# Patient Record
Sex: MALE | Race: WHITE | NOT HISPANIC OR LATINO | URBAN - NONMETROPOLITAN AREA
[De-identification: names, ages, dates, MRNs, and addresses within clinical notes are randomized per-mention and may not be internally consistent; named-entity substitution may affect disease eponyms.]

---

## 2022-03-31 ENCOUNTER — OFFICE VISIT (OUTPATIENT)
Dept: URBAN - NONMETROPOLITAN AREA CLINIC 2 | Facility: CLINIC | Age: 67
End: 2022-03-31

## 2022-08-04 ENCOUNTER — WEB ENCOUNTER (OUTPATIENT)
Dept: URBAN - NONMETROPOLITAN AREA CLINIC 2 | Facility: CLINIC | Age: 67
End: 2022-08-04

## 2022-08-04 ENCOUNTER — LAB OUTSIDE AN ENCOUNTER (OUTPATIENT)
Dept: URBAN - NONMETROPOLITAN AREA CLINIC 2 | Facility: CLINIC | Age: 67
End: 2022-08-04

## 2022-08-04 ENCOUNTER — OFFICE VISIT (OUTPATIENT)
Dept: URBAN - NONMETROPOLITAN AREA CLINIC 2 | Facility: CLINIC | Age: 67
End: 2022-08-04
Payer: COMMERCIAL

## 2022-08-04 VITALS
TEMPERATURE: 97.7 F | BODY MASS INDEX: 27.77 KG/M2 | DIASTOLIC BLOOD PRESSURE: 90 MMHG | HEIGHT: 70 IN | HEART RATE: 76 BPM | SYSTOLIC BLOOD PRESSURE: 144 MMHG | WEIGHT: 194 LBS

## 2022-08-04 DIAGNOSIS — C34.90 MALIGNANT NEOPLASM OF LUNG, UNSPECIFIED LATERALITY, UNSPECIFIED PART OF LUNG: ICD-10-CM

## 2022-08-04 DIAGNOSIS — Z12.11 ROUTINE COLON: ICD-10-CM

## 2022-08-04 DIAGNOSIS — R13.19 ESOPHAGEAL DYSPHAGIA: ICD-10-CM

## 2022-08-04 DIAGNOSIS — D36.9 TUBULOVILLOUS ADENOMA: ICD-10-CM

## 2022-08-04 PROCEDURE — 99204 OFFICE O/P NEW MOD 45 MIN: CPT | Performed by: NURSE PRACTITIONER

## 2022-08-04 RX ORDER — ATORVASTATIN CALCIUM, FILM COATED 20 MG/1
TAKE 1 TABLET BY MOUTH EVERY DAY TABLET ORAL
Qty: 90 EACH | Refills: 1 | Status: ACTIVE | COMMUNITY

## 2022-08-04 NOTE — HPI-TODAY'S VISIT:
8/4/2022 Mr. Perrin presents for follow-up of personal history of colon polyps.  In September 2016 he had a colonoscopy with a 4 cm cecal tubulovillous adenoma and 2 smaller tubular adenomas status post removal by Dr. Pimentel and Johana for bleeding.  He had a repeat colonoscopy a month later with no residual tissue.  He was recommended to repeat colonoscopy in 3 to 5 years however due to the COVID-19 pandemic he did not pursue this.  He has recently been diagnosed with lung cancer in the past 2 years.  He status post right lobectomy last year.  He is following with Dr. Mendez and just completed another round of chemotherapy.  He is due for repeat imaging next month.  He states after retiring 2 years ago he developed a food impaction after eating steak at his half-way party he went to [facility] status post EGD by Dr. Navarrete.  He was recommended again to repeat endoscopy after his food impaction, he did not follow-up given the pandemic.  He does have occasional dysphagia to solids, he denies any reflux or heartburn.  Today we have discussed risks and benefits and he agrees to pursue EGD and colonoscopy.  We will get the records from Dr. Torres.  MB

## 2022-09-29 ENCOUNTER — OFFICE VISIT (OUTPATIENT)
Dept: URBAN - METROPOLITAN AREA MEDICAL CENTER 1 | Facility: MEDICAL CENTER | Age: 67
End: 2022-09-29
Payer: COMMERCIAL

## 2022-09-29 ENCOUNTER — LAB OUTSIDE AN ENCOUNTER (OUTPATIENT)
Dept: URBAN - NONMETROPOLITAN AREA CLINIC 2 | Facility: CLINIC | Age: 67
End: 2022-09-29

## 2022-09-29 DIAGNOSIS — R10.13 ABDOMINAL DISCOMFORT, EPIGASTRIC: ICD-10-CM

## 2022-09-29 DIAGNOSIS — Z86.010 ADENOMAS PERSONAL HISTORY OF COLONIC POLYPS: ICD-10-CM

## 2022-09-29 DIAGNOSIS — K31.89 ACQUIRED DEFORMITY OF DUODENUM: ICD-10-CM

## 2022-09-29 DIAGNOSIS — K22.2 ACQUIRED ESOPHAGEAL RING: ICD-10-CM

## 2022-09-29 DIAGNOSIS — D12.2 ADENOMA OF ASCENDING COLON: ICD-10-CM

## 2022-09-29 DIAGNOSIS — D12.0 ADENOMA OF CECUM: ICD-10-CM

## 2022-09-29 DIAGNOSIS — K22.89 DILATATION OF ESOPHAGUS: ICD-10-CM

## 2022-09-29 PROCEDURE — 45385 COLONOSCOPY W/LESION REMOVAL: CPT | Performed by: INTERNAL MEDICINE

## 2022-09-29 PROCEDURE — 43239 EGD BIOPSY SINGLE/MULTIPLE: CPT | Performed by: INTERNAL MEDICINE

## 2022-09-29 PROCEDURE — 43249 ESOPH EGD DILATION <30 MM: CPT | Performed by: INTERNAL MEDICINE

## 2022-10-03 LAB
AP CASE REPORT: (no result)
AP FINAL DIAGNOSIS: (no result)
AP GROSS DESCRIPTION: (no result)
AP MICROSCOPIC DESCRIPTION: (no result)
AP SPECIAL STAINS: (no result)

## 2022-10-05 ENCOUNTER — TELEPHONE ENCOUNTER (OUTPATIENT)
Dept: URBAN - NONMETROPOLITAN AREA CLINIC 2 | Facility: CLINIC | Age: 67
End: 2022-10-05

## 2022-10-05 RX ORDER — PANTOPRAZOLE SODIUM 40 MG/1
1 TABLET TABLET, DELAYED RELEASE ORAL TWICE DAILY
Qty: 60 TABLET | Refills: 6 | OUTPATIENT
Start: 2022-10-06

## 2022-10-31 ENCOUNTER — ERX REFILL RESPONSE (OUTPATIENT)
Dept: URBAN - NONMETROPOLITAN AREA CLINIC 2 | Facility: CLINIC | Age: 67
End: 2022-10-31

## 2022-10-31 RX ORDER — PANTOPRAZOLE SODIUM 40 MG/1
1 TABLET TABLET, DELAYED RELEASE ORAL TWICE DAILY
Qty: 60 TABLET | Refills: 6 | OUTPATIENT

## 2022-10-31 RX ORDER — PANTOPRAZOLE SODIUM 40 MG/1
TAKE 1 TABLET BY MOUTH TWICE A DAY FOR 30 DAYS TABLET, DELAYED RELEASE ORAL
Qty: 60 TABLET | Refills: 6 | OUTPATIENT

## 2023-02-27 ENCOUNTER — OFFICE VISIT (OUTPATIENT)
Dept: URBAN - NONMETROPOLITAN AREA CLINIC 2 | Facility: CLINIC | Age: 68
End: 2023-02-27
Payer: COMMERCIAL

## 2023-02-27 ENCOUNTER — DASHBOARD ENCOUNTERS (OUTPATIENT)
Age: 68
End: 2023-02-27

## 2023-02-27 VITALS
DIASTOLIC BLOOD PRESSURE: 76 MMHG | TEMPERATURE: 97.5 F | HEART RATE: 65 BPM | HEIGHT: 70 IN | WEIGHT: 195 LBS | SYSTOLIC BLOOD PRESSURE: 143 MMHG | BODY MASS INDEX: 27.92 KG/M2

## 2023-02-27 DIAGNOSIS — R13.19 ESOPHAGEAL DYSPHAGIA: ICD-10-CM

## 2023-02-27 DIAGNOSIS — Z12.11 ROUTINE COLON: ICD-10-CM

## 2023-02-27 DIAGNOSIS — K21.00 GASTROESOPHAGEAL REFLUX DISEASE WITH ESOPHAGITIS WITHOUT HEMORRHAGE: ICD-10-CM

## 2023-02-27 DIAGNOSIS — K57.90 DIVERTICULOSIS: ICD-10-CM

## 2023-02-27 DIAGNOSIS — D36.9 TUBULOVILLOUS ADENOMA: ICD-10-CM

## 2023-02-27 DIAGNOSIS — C34.90 MALIGNANT NEOPLASM OF LUNG, UNSPECIFIED LATERALITY, UNSPECIFIED PART OF LUNG: ICD-10-CM

## 2023-02-27 PROBLEM — 40890009: Status: ACTIVE | Noted: 2022-08-04

## 2023-02-27 PROBLEM — 266433003: Status: ACTIVE | Noted: 2023-02-27

## 2023-02-27 PROBLEM — 363358000: Status: ACTIVE | Noted: 2022-08-04

## 2023-02-27 PROBLEM — 397881000: Status: ACTIVE | Noted: 2023-02-27

## 2023-02-27 PROBLEM — 443416007: Status: ACTIVE | Noted: 2022-08-04

## 2023-02-27 PROCEDURE — 99214 OFFICE O/P EST MOD 30 MIN: CPT | Performed by: NURSE PRACTITIONER

## 2023-02-27 RX ORDER — PANTOPRAZOLE SODIUM 40 MG/1
TAKE 1 TABLET BY MOUTH TWICE A DAY FOR 30 DAYS TABLET, DELAYED RELEASE ORAL
Qty: 60 TABLET | Refills: 6 | Status: ACTIVE | COMMUNITY

## 2023-02-27 RX ORDER — ATORVASTATIN CALCIUM, FILM COATED 20 MG/1
TAKE 1 TABLET BY MOUTH EVERY DAY TABLET ORAL
Qty: 90 EACH | Refills: 1 | Status: ACTIVE | COMMUNITY

## 2023-02-27 NOTE — HPI-TODAY'S VISIT:
8/4/2022 Mr. Perrin presents for follow-up of personal history of colon polyps.  In September 2016 he had a colonoscopy with a 4 cm cecal tubulovillous adenoma and 2 smaller tubular adenomas status post removal by Dr. Pimentel and Johana for bleeding.  He had a repeat colonoscopy a month later with no residual tissue.  He was recommended to repeat colonoscopy in 3 to 5 years however due to the COVID-19 pandemic he did not pursue this.  He has recently been diagnosed with lung cancer in the past 2 years.  He status post right lobectomy last year.  He is following with Dr. Mendez and just completed another round of chemotherapy.  He is due for repeat imaging next month.  He states after retiring 2 years ago he developed a food impaction after eating steak at his senior living party he went to [facility] status post EGD by Dr. Navarrete.  He was recommended again to repeat endoscopy after his food impaction, he did not follow-up given the pandemic.  He does have occasional dysphagia to solids, he denies any reflux or heartburn.  Today we have discussed risks and benefits and he agrees to pursue EGD and colonoscopy.  We will get the records from Dr. Torres.  MB 2/27/2023 Mr. Perrin presents for endoscopy follow-up.  His EGD reveals esophagitis, Schatzki's ring, and a 1 cm hiatal hernia.  Dr. Pimentel dilated him to 18 mm.  His dysphagia has resolved.  He is on pantoprazole 40 mg twice daily.  He agrees to wean to 20 mg twice daily and then daily this summer.  His colonoscopy reveals anastomosis x2 in the right colon and sigmoid along with diverticular disease.  We have discussed adding psyllium daily.  Today he denies any new GI complaints.  MB